# Patient Record
Sex: MALE | Race: WHITE | NOT HISPANIC OR LATINO | Employment: OTHER | URBAN - METROPOLITAN AREA
[De-identification: names, ages, dates, MRNs, and addresses within clinical notes are randomized per-mention and may not be internally consistent; named-entity substitution may affect disease eponyms.]

---

## 2024-03-01 ENCOUNTER — HOSPITAL ENCOUNTER (EMERGENCY)
Facility: HOSPITAL | Age: 74
Discharge: HOME | End: 2024-03-01
Attending: EMERGENCY MEDICINE
Payer: MEDICARE

## 2024-03-01 ENCOUNTER — HOSPITAL ENCOUNTER (EMERGENCY)
Facility: HOSPITAL | Age: 74
Discharge: HOME | End: 2024-03-01
Attending: INTERNAL MEDICINE
Payer: MEDICARE

## 2024-03-01 VITALS
TEMPERATURE: 97.9 F | SYSTOLIC BLOOD PRESSURE: 132 MMHG | RESPIRATION RATE: 18 BRPM | HEIGHT: 67 IN | BODY MASS INDEX: 25.74 KG/M2 | WEIGHT: 164 LBS | HEART RATE: 72 BPM | DIASTOLIC BLOOD PRESSURE: 64 MMHG | OXYGEN SATURATION: 99 %

## 2024-03-01 VITALS
RESPIRATION RATE: 18 BRPM | TEMPERATURE: 97.2 F | WEIGHT: 164 LBS | DIASTOLIC BLOOD PRESSURE: 58 MMHG | OXYGEN SATURATION: 99 % | BODY MASS INDEX: 25.74 KG/M2 | SYSTOLIC BLOOD PRESSURE: 114 MMHG | HEART RATE: 62 BPM | HEIGHT: 67 IN

## 2024-03-01 DIAGNOSIS — R04.0 EPISTAXIS: Primary | ICD-10-CM

## 2024-03-01 DIAGNOSIS — R04.0 RECURRENT EPISTAXIS: ICD-10-CM

## 2024-03-01 DIAGNOSIS — R04.0 ANTERIOR EPISTAXIS: Primary | ICD-10-CM

## 2024-03-01 LAB
ANION GAP SERPL CALC-SCNC: 15 MMOL/L (ref 10–20)
APTT PPP: 34 SECONDS (ref 27–38)
BASOPHILS # BLD AUTO: 0.02 X10*3/UL (ref 0–0.1)
BASOPHILS NFR BLD AUTO: 0.3 %
BUN SERPL-MCNC: 35 MG/DL (ref 6–23)
CALCIUM SERPL-MCNC: 8.5 MG/DL (ref 8.6–10.3)
CHLORIDE SERPL-SCNC: 106 MMOL/L (ref 98–107)
CO2 SERPL-SCNC: 20 MMOL/L (ref 21–32)
CREAT SERPL-MCNC: 1.97 MG/DL (ref 0.5–1.3)
EGFRCR SERPLBLD CKD-EPI 2021: 36 ML/MIN/1.73M*2
EOSINOPHIL # BLD AUTO: 0.09 X10*3/UL (ref 0–0.4)
EOSINOPHIL NFR BLD AUTO: 1.4 %
ERYTHROCYTE [DISTWIDTH] IN BLOOD BY AUTOMATED COUNT: 13.4 % (ref 11.5–14.5)
GLUCOSE SERPL-MCNC: 134 MG/DL (ref 74–99)
HCT VFR BLD AUTO: 35.5 % (ref 41–52)
HGB BLD-MCNC: 12 G/DL (ref 13.5–17.5)
IMM GRANULOCYTES # BLD AUTO: 0.03 X10*3/UL (ref 0–0.5)
IMM GRANULOCYTES NFR BLD AUTO: 0.5 % (ref 0–0.9)
INR PPP: 1.1 (ref 0.9–1.1)
LYMPHOCYTES # BLD AUTO: 0.59 X10*3/UL (ref 0.8–3)
LYMPHOCYTES NFR BLD AUTO: 9.4 %
MCH RBC QN AUTO: 32.6 PG (ref 26–34)
MCHC RBC AUTO-ENTMCNC: 33.8 G/DL (ref 32–36)
MCV RBC AUTO: 97 FL (ref 80–100)
MONOCYTES # BLD AUTO: 0.4 X10*3/UL (ref 0.05–0.8)
MONOCYTES NFR BLD AUTO: 6.3 %
NEUTROPHILS # BLD AUTO: 5.18 X10*3/UL (ref 1.6–5.5)
NEUTROPHILS NFR BLD AUTO: 82.1 %
NRBC BLD-RTO: 0 /100 WBCS (ref 0–0)
PLATELET # BLD AUTO: 183 X10*3/UL (ref 150–450)
POTASSIUM SERPL-SCNC: 3.6 MMOL/L (ref 3.5–5.3)
PROTHROMBIN TIME: 12 SECONDS (ref 9.8–12.8)
RBC # BLD AUTO: 3.68 X10*6/UL (ref 4.5–5.9)
SODIUM SERPL-SCNC: 137 MMOL/L (ref 136–145)
WBC # BLD AUTO: 6.3 X10*3/UL (ref 4.4–11.3)

## 2024-03-01 PROCEDURE — 2500000001 HC RX 250 WO HCPCS SELF ADMINISTERED DRUGS (ALT 637 FOR MEDICARE OP): Performed by: EMERGENCY MEDICINE

## 2024-03-01 PROCEDURE — 2500000005 HC RX 250 GENERAL PHARMACY W/O HCPCS: Performed by: NURSE PRACTITIONER

## 2024-03-01 PROCEDURE — 30901 CONTROL OF NOSEBLEED: CPT | Mod: LT | Performed by: EMERGENCY MEDICINE

## 2024-03-01 PROCEDURE — 99283 EMERGENCY DEPT VISIT LOW MDM: CPT | Mod: 25

## 2024-03-01 PROCEDURE — 85025 COMPLETE CBC W/AUTO DIFF WBC: CPT | Performed by: EMERGENCY MEDICINE

## 2024-03-01 PROCEDURE — 85610 PROTHROMBIN TIME: CPT | Performed by: EMERGENCY MEDICINE

## 2024-03-01 PROCEDURE — 30901 CONTROL OF NOSEBLEED: CPT | Mod: RT | Performed by: NURSE PRACTITIONER

## 2024-03-01 PROCEDURE — 36415 COLL VENOUS BLD VENIPUNCTURE: CPT | Performed by: EMERGENCY MEDICINE

## 2024-03-01 PROCEDURE — 85730 THROMBOPLASTIN TIME PARTIAL: CPT | Performed by: EMERGENCY MEDICINE

## 2024-03-01 PROCEDURE — 80048 BASIC METABOLIC PNL TOTAL CA: CPT | Performed by: EMERGENCY MEDICINE

## 2024-03-01 RX ORDER — TRANEXAMIC ACID 100 MG/ML
1000 INJECTION, SOLUTION INTRAVENOUS ONCE
Status: COMPLETED | OUTPATIENT
Start: 2024-03-01 | End: 2024-03-01

## 2024-03-01 RX ORDER — GUAIFENESIN/DEXTROMETHORPHAN 100-10MG/5
5 SYRUP ORAL ONCE
Status: COMPLETED | OUTPATIENT
Start: 2024-03-01 | End: 2024-03-01

## 2024-03-01 RX ORDER — OXYMETAZOLINE HCL 0.05 %
2 SPRAY, NON-AEROSOL (ML) NASAL ONCE
Status: COMPLETED | OUTPATIENT
Start: 2024-03-01 | End: 2024-03-01

## 2024-03-01 RX ADMIN — TRANEXAMIC ACID 1000 MG: 100 INJECTION, SOLUTION INTRAVENOUS at 12:05

## 2024-03-01 RX ADMIN — GUAIFENESIN AND DEXTROMETHORPHAN 5 ML: 100; 10 SYRUP ORAL at 05:47

## 2024-03-01 RX ADMIN — OXYMETAZOLINE HYDROCHLORIDE 2 SPRAY: 0.05 SPRAY NASAL at 04:35

## 2024-03-01 ASSESSMENT — COLUMBIA-SUICIDE SEVERITY RATING SCALE - C-SSRS

## 2024-03-01 ASSESSMENT — PAIN SCALES - GENERAL: PAINLEVEL_OUTOF10: 0 - NO PAIN

## 2024-03-01 ASSESSMENT — LIFESTYLE VARIABLES
HAVE PEOPLE ANNOYED YOU BY CRITICIZING YOUR DRINKING: NO
EVER FELT BAD OR GUILTY ABOUT YOUR DRINKING: NO
EVER HAD A DRINK FIRST THING IN THE MORNING TO STEADY YOUR NERVES TO GET RID OF A HANGOVER: NO
HAVE YOU EVER FELT YOU SHOULD CUT DOWN ON YOUR DRINKING: NO

## 2024-03-01 ASSESSMENT — PAIN - FUNCTIONAL ASSESSMENT: PAIN_FUNCTIONAL_ASSESSMENT: 0-10

## 2024-03-01 NOTE — ED PROVIDER NOTES
"HPI  Leo Clark is a 73 y.o. male presenting to the ED with epistaxis.  Uncertain which nostril.  No anticoagulations.  No trauma.  Reports he has been coughing so much that it has caused his epistaxis.  Did not hold pressure at home.    PMH  No past medical history on file.    Meds  No current outpatient medications    Allergies  No Known Allergies     SHx       ROS  Review of Systems   Constitutional:  Negative for chills and fever.   HENT:  Positive for nosebleeds. Negative for ear pain and sore throat.    Eyes:  Negative for pain and visual disturbance.   Respiratory:  Positive for cough. Negative for shortness of breath.    Cardiovascular:  Negative for chest pain and palpitations.   Gastrointestinal:  Negative for abdominal pain and vomiting.   Genitourinary:  Negative for dysuria and hematuria.   Musculoskeletal:  Negative for arthralgias and back pain.   Skin:  Negative for color change and rash.   Neurological:  Negative for seizures and syncope.   All other systems reviewed and are negative.      ------------------------------------------------------------------------------------------------------------------------------------------    /64   Pulse 72   Temp 36.6 °C (97.9 °F)   Resp 18   Ht 1.7 m (5' 6.93\")   Wt 74.4 kg (164 lb)   SpO2 99%   BMI 25.74 kg/m²     Physical Exam  Vitals and nursing note reviewed.   Constitutional:       General: He is not in acute distress.     Appearance: Normal appearance.   HENT:      Head: Normocephalic and atraumatic.      Comments: L sided dried blood  Small area of oozing in the L mucosa  Blood in back of oropharynx     Right Ear: External ear normal.      Left Ear: External ear normal.   Eyes:      Extraocular Movements: Extraocular movements intact.      Conjunctiva/sclera: Conjunctivae normal.   Cardiovascular:      Rate and Rhythm: Normal rate and regular rhythm.      Pulses: Normal pulses.   Pulmonary:      Effort: Pulmonary effort is normal. " No respiratory distress.   Abdominal:      General: There is no distension.   Musculoskeletal:         General: Normal range of motion.      Cervical back: Neck supple.   Skin:     General: Skin is warm and dry.   Neurological:      General: No focal deficit present.      Mental Status: He is alert and oriented to person, place, and time.   Psychiatric:         Mood and Affect: Mood normal.          ------------------------------------------------------------------------------------------------------------------------------------------    Epistaxis Management    Performed by: Misael Orr MD  Authorized by: Misael Orr MD    Consent:     Consent obtained:  Verbal  Anesthesia:     Anesthesia method:  None  Procedure details:     Treatment site:  L anterior    Treatment method:  Silver nitrate    Treatment complexity:  Limited    Treatment episode: initial    Post-procedure details:     Assessment:  Bleeding decreased    Procedure completion:  Tolerated well, no immediate complications        Medical Decision Makinyo M presenting with epistaxis.  VSS.  Labs obtained, no coagulopathy.  No anemia 2/2 bleeding.  Afrin given.  L nostril with dried blood and small venous oozing.  This was cauterized.  He then had bleeding out of his R nostril.  Nasal clip applied and no further bleeding after 15 minutes.  Stable for discharge.  Return precautions provided.    Diagnoses as of 24   Epistaxis         Misael Orr MD  Emergency Medicine Attending       Misael Orr MD  [phone]

## 2024-03-01 NOTE — DISCHARGE INSTRUCTIONS
You were seen in the ED for a nosebleed.  Continue taking all medications as prescribed.  Do not pick your nose or place anything in your nose.    If you bleed again, apply pressure for 15 minutes.  If the bleeding does not stop, return to the ED.    For your cough, you can take Robitussin.

## 2024-03-01 NOTE — ED TRIAGE NOTES
Pt presents to ED from home with reports of being seen this morning for a nose bleed, went home and it started bleeding again.

## 2024-03-01 NOTE — ED PROVIDER NOTES
HPI   Chief Complaint   Patient presents with   • Epistaxis (Nose Bleed)       Patient is a 73-year-old male who presents ED today due to a recurrent nosebleed.  He was seen earlier today and had the area cauterized with silver nitrate and seems to be doing well until this morning when he was coughing again and the nose started to bleed again.  Patient denies any headache or dizziness.  Patient is on Plavix and this may be contributing to the symptoms.      History provided by:  Patient   used: No                        Tyrese Coma Scale Score: 15                     Patient History   No past medical history on file.  No past surgical history on file.  No family history on file.  Social History     Tobacco Use   • Smoking status: Not on file   • Smokeless tobacco: Not on file   Substance Use Topics   • Alcohol use: Not on file   • Drug use: Not on file       Physical Exam   ED Triage Vitals   Temperature Heart Rate Respirations BP   03/01/24 1053 03/01/24 1054 03/01/24 1054 03/01/24 1054   36.2 °C (97.2 °F) 62 18 114/58      Pulse Ox Temp src Heart Rate Source Patient Position   03/01/24 1054 -- -- --   99 %         BP Location FiO2 (%)     -- --             Physical Exam  Vitals and nursing note reviewed.   Constitutional:       General: He is not in acute distress.     Appearance: He is well-developed.   HENT:      Head: Normocephalic and atraumatic.      Nose: Septal deviation and congestion present.      Right Nostril: Epistaxis present.      Left Nostril: No epistaxis.      Right Turbinates: Swollen.      Left Turbinates: Swollen.   Eyes:      Conjunctiva/sclera: Conjunctivae normal.   Cardiovascular:      Rate and Rhythm: Normal rate and regular rhythm.      Heart sounds: No murmur heard.  Pulmonary:      Effort: Pulmonary effort is normal. No respiratory distress.      Breath sounds: Normal breath sounds.   Abdominal:      Palpations: Abdomen is soft.      Tenderness: There is no  abdominal tenderness.   Musculoskeletal:         General: No swelling.      Cervical back: Neck supple.   Skin:     General: Skin is warm and dry.      Capillary Refill: Capillary refill takes less than 2 seconds.   Neurological:      Mental Status: He is alert.   Psychiatric:         Mood and Affect: Mood normal.         ED Course & [facility]   ED Course as of 03/01/24 1650   Fri Mar 01, 2024   1316 HPI      Patient presenting for recurrent nosebleed.  Patient states he is having bleeding from the right nostril.  Patient states he had cauterization performed recently.      Physical Exam     Appearance: Awake and alert, not in distress.  Skin: No rash or lesions, warm and dry  Eyes: Pupils equal and reactive, EOMI  ENT: Nasal packing present in right nostril.  No active bleeding at this time.  Neck: Supple, no meningeal signs.  Pulmonary: CTA bilaterally with good air movement  Cardiac: Normal rate, regular rhythm.  Radial and DP Pulses equal BL  Abdomen: Soft and nontender, nondistended, no mass.  Genitourinary: No flank tenderness.  Musculoskeletal: No signs of trauma, strong pulses.  No tenderness along the venous system.  Neurological: Clear speech, NIH 0. CN II-XII grossly intact, no focal neuro deficit  Psychiatric: Appropriate mood and affect.        Medical Decision Making:         Patient is concern for bleeding in the posterior oropharynx post packing placement.  I do not see active bleeding however we will rinse the mouth with ice water and reevaluate.      I personally saw the patient and made/approved the management plan and take responsibility for the patient management.        Disclaimer: This note was dictated by speech recognition. Minor errors in transcription may be present.  Please call if questions.   [JA]      ED Course User Index  [JA] Fox Gill DO         Diagnoses as of 03/01/24 1650   Anterior epistaxis   Recurrent epistaxis       Medical Decision Making  Differential diagnosis: Anterior  epistaxis, posterior epistaxis  Patient's vital signs are stable.  Patient's examination is consistent with an anterior epistaxis.  Given failure of cautery previously, I will place a packing soaked in TXA and have patient follow-up with ENT.  He should return to the ED or ENT to remove packing within 48 hours of placement due to risk of infection.  Patient verbalized understanding of this.  For epistaxis treatment, see procedure note.    I discussed the differential, results and discharge plan with the patient.  I emphasized the importance of follow-up with the physician I referred them to in the timeframe recommended.  I explained reasons for the them to return to the Emergency Department. Additional verbal discharge instructions were also given and discussed with them to supplement those generated by the EMR. We also discussed medications that were prescribed (if any) including common side effects and interactions. All questions were addressed.  They understand return precautions and discharge instructions. They expressed understanding.            Procedure  Epistaxis Management    Performed by: CHARITO Dalton-CNP  Authorized by: Fox Gill DO    Consent:     Consent obtained:  Verbal    Consent given by:  Patient    Risks, benefits, and alternatives were discussed: yes      Risks discussed:  Bleeding, infection, nasal injury and pain    Alternatives discussed:  No treatment, delayed treatment, alternative treatment, observation and referral  Universal protocol:     Procedure explained and questions answered to patient or proxy's satisfaction: yes      Relevant documents present and verified: yes      Required blood products, implants, devices, and special equipment available: yes      Immediately prior to procedure, a time out was called: yes      Patient identity confirmed:  Verbally with patient and arm band  Anesthesia:     Anesthesia method:  None  Procedure details:     Treatment site:  R  anterior    Treatment method:  Merocel sponge    Treatment complexity:  Limited    Treatment episode: initial    Post-procedure details:     Assessment:  Bleeding stopped    Procedure completion:  Tolerated well, no immediate complications       Delon Hopkins, CHARITO-MYRON  03/01/24 7989

## 2024-03-02 ASSESSMENT — ENCOUNTER SYMPTOMS
SHORTNESS OF BREATH: 0
SEIZURES: 0
BACK PAIN: 0
SORE THROAT: 0
EYE PAIN: 0
COLOR CHANGE: 0
CHILLS: 0
ABDOMINAL PAIN: 0
PALPITATIONS: 0
FEVER: 0
COUGH: 1
DYSURIA: 0
ARTHRALGIAS: 0
VOMITING: 0
HEMATURIA: 0

## 2025-06-03 ENCOUNTER — LAB REQUISITION (OUTPATIENT)
Dept: LAB | Facility: HOSPITAL | Age: 75
End: 2025-06-03
Payer: MEDICARE

## 2025-06-03 DIAGNOSIS — J18.8 OTHER PNEUMONIA, UNSPECIFIED ORGANISM: ICD-10-CM

## 2025-06-03 LAB
ANION GAP SERPL CALC-SCNC: 14 MMOL/L (ref 10–20)
BASOPHILS # BLD AUTO: 0.03 X10*3/UL (ref 0–0.1)
BASOPHILS NFR BLD AUTO: 0.3 %
BUN SERPL-MCNC: 32 MG/DL (ref 6–23)
CALCIUM SERPL-MCNC: 9.2 MG/DL (ref 8.6–10.3)
CHLORIDE SERPL-SCNC: 106 MMOL/L (ref 98–107)
CO2 SERPL-SCNC: 22 MMOL/L (ref 21–32)
CREAT SERPL-MCNC: 1.29 MG/DL (ref 0.5–1.3)
EGFRCR SERPLBLD CKD-EPI 2021: 59 ML/MIN/1.73M*2
EOSINOPHIL # BLD AUTO: 0.21 X10*3/UL (ref 0–0.4)
EOSINOPHIL NFR BLD AUTO: 2.3 %
ERYTHROCYTE [DISTWIDTH] IN BLOOD BY AUTOMATED COUNT: 18.7 % (ref 11.5–14.5)
GLUCOSE SERPL-MCNC: 78 MG/DL (ref 74–99)
HCT VFR BLD AUTO: 28.3 % (ref 41–52)
HGB BLD-MCNC: 8.5 G/DL (ref 13.5–17.5)
IMM GRANULOCYTES # BLD AUTO: 0.14 X10*3/UL (ref 0–0.5)
IMM GRANULOCYTES NFR BLD AUTO: 1.5 % (ref 0–0.9)
LYMPHOCYTES # BLD AUTO: 0.57 X10*3/UL (ref 0.8–3)
LYMPHOCYTES NFR BLD AUTO: 6.3 %
MCH RBC QN AUTO: 27.3 PG (ref 26–34)
MCHC RBC AUTO-ENTMCNC: 30 G/DL (ref 32–36)
MCV RBC AUTO: 91 FL (ref 80–100)
MONOCYTES # BLD AUTO: 0.43 X10*3/UL (ref 0.05–0.8)
MONOCYTES NFR BLD AUTO: 4.7 %
NEUTROPHILS # BLD AUTO: 7.69 X10*3/UL (ref 1.6–5.5)
NEUTROPHILS NFR BLD AUTO: 84.9 %
NRBC BLD-RTO: 0.4 /100 WBCS (ref 0–0)
PLATELET # BLD AUTO: 192 X10*3/UL (ref 150–450)
POTASSIUM SERPL-SCNC: 4.2 MMOL/L (ref 3.5–5.3)
RBC # BLD AUTO: 3.11 X10*6/UL (ref 4.5–5.9)
SODIUM SERPL-SCNC: 138 MMOL/L (ref 136–145)
WBC # BLD AUTO: 9.1 X10*3/UL (ref 4.4–11.3)

## 2025-06-03 PROCEDURE — 80048 BASIC METABOLIC PNL TOTAL CA: CPT | Mod: OUT | Performed by: INTERNAL MEDICINE

## 2025-06-03 PROCEDURE — 36415 COLL VENOUS BLD VENIPUNCTURE: CPT | Mod: OUT | Performed by: INTERNAL MEDICINE

## 2025-06-03 PROCEDURE — 85025 COMPLETE CBC W/AUTO DIFF WBC: CPT | Mod: OUT | Performed by: INTERNAL MEDICINE
